# Patient Record
Sex: FEMALE | Race: BLACK OR AFRICAN AMERICAN
[De-identification: names, ages, dates, MRNs, and addresses within clinical notes are randomized per-mention and may not be internally consistent; named-entity substitution may affect disease eponyms.]

---

## 2020-11-04 ENCOUNTER — HOSPITAL ENCOUNTER (EMERGENCY)
Dept: HOSPITAL 72 - EMR | Age: 35
Discharge: HOME | End: 2020-11-04
Payer: COMMERCIAL

## 2020-11-04 VITALS — SYSTOLIC BLOOD PRESSURE: 99 MMHG | DIASTOLIC BLOOD PRESSURE: 74 MMHG

## 2020-11-04 VITALS — DIASTOLIC BLOOD PRESSURE: 74 MMHG | SYSTOLIC BLOOD PRESSURE: 99 MMHG

## 2020-11-04 VITALS — WEIGHT: 113 LBS | HEIGHT: 60 IN | BODY MASS INDEX: 22.19 KG/M2

## 2020-11-04 DIAGNOSIS — R91.1: ICD-10-CM

## 2020-11-04 DIAGNOSIS — N30.00: Primary | ICD-10-CM

## 2020-11-04 LAB
ADD MANUAL DIFF: NO
ALBUMIN SERPL-MCNC: 4 G/DL (ref 3.4–5)
ALBUMIN/GLOB SERPL: 1.1 {RATIO} (ref 1–2.7)
ALP SERPL-CCNC: 50 U/L (ref 46–116)
ALT SERPL-CCNC: 17 U/L (ref 12–78)
ANION GAP SERPL CALC-SCNC: 9 MMOL/L (ref 5–15)
APPEARANCE UR: (no result)
APTT PPP: (no result) S
AST SERPL-CCNC: 26 U/L (ref 15–37)
BASOPHILS NFR BLD AUTO: 0.6 % (ref 0–2)
BILIRUB SERPL-MCNC: 0.5 MG/DL (ref 0.2–1)
BUN SERPL-MCNC: 13 MG/DL (ref 7–18)
CALCIUM SERPL-MCNC: 8.7 MG/DL (ref 8.5–10.1)
CHLORIDE SERPL-SCNC: 104 MMOL/L (ref 98–107)
CO2 SERPL-SCNC: 26 MMOL/L (ref 21–32)
CREAT SERPL-MCNC: 0.9 MG/DL (ref 0.55–1.3)
EOSINOPHIL NFR BLD AUTO: 1 % (ref 0–3)
ERYTHROCYTE [DISTWIDTH] IN BLOOD BY AUTOMATED COUNT: 11.5 % (ref 11.6–14.8)
GLOBULIN SER-MCNC: 3.8 G/DL
GLUCOSE UR STRIP-MCNC: NEGATIVE MG/DL
HCT VFR BLD CALC: 47 % (ref 37–47)
HGB BLD-MCNC: 15.7 G/DL (ref 12–16)
KETONES UR QL STRIP: NEGATIVE
LEUKOCYTE ESTERASE UR QL STRIP: (no result)
LYMPHOCYTES NFR BLD AUTO: 8.2 % (ref 20–45)
MCV RBC AUTO: 98 FL (ref 80–99)
MONOCYTES NFR BLD AUTO: 6.6 % (ref 1–10)
NEUTROPHILS NFR BLD AUTO: 83.7 % (ref 45–75)
NITRITE UR QL STRIP: NEGATIVE
PH UR STRIP: 8 [PH] (ref 4.5–8)
PLATELET # BLD: 211 K/UL (ref 150–450)
POTASSIUM SERPL-SCNC: 3.9 MMOL/L (ref 3.5–5.1)
PROT UR QL STRIP: (no result)
RBC # BLD AUTO: 4.8 M/UL (ref 4.2–5.4)
SODIUM SERPL-SCNC: 139 MMOL/L (ref 136–145)
SP GR UR STRIP: 1.01 (ref 1–1.03)
UROBILINOGEN UR-MCNC: NORMAL MG/DL (ref 0–1)
WBC # BLD AUTO: 12 K/UL (ref 4.8–10.8)

## 2020-11-04 PROCEDURE — 87181 SC STD AGAR DILUTION PER AGT: CPT

## 2020-11-04 PROCEDURE — 99284 EMERGENCY DEPT VISIT MOD MDM: CPT

## 2020-11-04 PROCEDURE — 76856 US EXAM PELVIC COMPLETE: CPT

## 2020-11-04 PROCEDURE — 80053 COMPREHEN METABOLIC PANEL: CPT

## 2020-11-04 PROCEDURE — 36415 COLL VENOUS BLD VENIPUNCTURE: CPT

## 2020-11-04 PROCEDURE — 96375 TX/PRO/DX INJ NEW DRUG ADDON: CPT

## 2020-11-04 PROCEDURE — 87086 URINE CULTURE/COLONY COUNT: CPT

## 2020-11-04 PROCEDURE — 85025 COMPLETE CBC W/AUTO DIFF WBC: CPT

## 2020-11-04 PROCEDURE — 76830 TRANSVAGINAL US NON-OB: CPT

## 2020-11-04 PROCEDURE — 81025 URINE PREGNANCY TEST: CPT

## 2020-11-04 PROCEDURE — 96365 THER/PROPH/DIAG IV INF INIT: CPT

## 2020-11-04 PROCEDURE — 84702 CHORIONIC GONADOTROPIN TEST: CPT

## 2020-11-04 PROCEDURE — 74177 CT ABD & PELVIS W/CONTRAST: CPT

## 2020-11-04 PROCEDURE — 83690 ASSAY OF LIPASE: CPT

## 2020-11-04 PROCEDURE — 81003 URINALYSIS AUTO W/O SCOPE: CPT

## 2020-11-04 NOTE — DIAGNOSTIC IMAGING REPORT
Clinical Indication: Abdominal pain

 

Technique:   No oral contrast utilized, per emergency room physician request  IV

administration nonionic contrast. Venous phase spiral acquisition obtained through

the abdomen and pelvis. Multiplanar reconstructions were generated. Total dose length

product 170 mGycm. CTDIvol(s) 3 mGy. Dose reduction achieved using automated exposure

control

 

 

Comparison: none

 

Findings: Lack of enteric contrast limits assessment of the GI tract. The appendix is

normal. No evidence of diverticulosis or diverticulitis. There is trace free pelvic

fluid. No small bowel distention. No free intraperitoneal gas. The stomach is

somewhat distended with food, otherwise unremarkable. Distal esophagus and duodenum

are unremarkable.

 

The liver, gallbladder, bile ducts, pancreas, spleen, adrenals, kidneys are

unremarkable. No retroperitoneal or mesenteric mass or adenopathy. No pelvic mass or

adenopathy.

 

The included lung bases demonstrate a 3 mm subpleural nodule on the right, image 5 of

series 6. The bones are unremarkable.

 

Impression: Limited assessment of the GI tract, due to lack of enteric contrast

administration

 

No definite acute abnormality

 

Small amount of free pelvic fluid, presumably physiologic

 

 

 

 

 

The CT scanner at [facility] is accredited by the American College of

Radiology and the scans are performed using protocols designed to limit radiation

exposure to as low as reasonably achievable to attain images of sufficient resolution

adequate for diagnostic evaluation.

## 2020-11-04 NOTE — DIAGNOSTIC IMAGING REPORT
Indication: Pelvic and right adnexal region pain, negative pregnancy test

 

Technique: Transabdominal and transvaginal images of the pelvis. Doppler

interrogation of the ovaries

 

Comparison: none

 

Findings: Uterus measures 7.4 cm length by 4 cm AP. Endometrium measures 7 mm thick.

No myometrial abnormality. Normal right ovary is normal in size, demonstrates normal

Doppler signal. The left ovary cannot be visualized. There is trace free cul-de-sac

fluid.

 

Impression: Trace free cul-de-sac fluid, presumably physiologic

 

No acute or significant abnormality. Note inability to visualize the left ovary

## 2020-11-04 NOTE — EMERGENCY ROOM REPORT
History of Present Illness


General


Chief Complaint:  Abdominal Pain


Source:  Patient





Present Illness


HPI


35-year-old female presents with right lower quadrant pain sharp in nature came 

on 2 hours ago aggravated with pressure, alleviated with rest severity is 

moderate, constant no fevers no chills, patient endorses nausea, vomiting, 

patient presents for evaluation and treatment


Allergies:  


Coded Allergies:  


     No Known Allergies (Unverified , 11/4/20)





COVID-19 Screening


Contact w/high risk pt:  No


Experienced COVID-19 symptoms?:  No


COVID-19 Testing performed PTA:  No





Patient History


Past Medical History:  see triage record


Last Menstrual Period:  spotting today


Pregnant Now:  No


Reviewed Nursing Documentation:  PMH: Agreed; PSxH: Agreed





Nursing Documentation-PMH


Past Medical History:  No Stated History





Review of Systems


All Other Systems:  negative except mentioned in HPI





Physical Exam





Vital Signs








  Date Time  Temp Pulse Resp B/P (MAP) Pulse Ox O2 Delivery O2 Flow Rate FiO2


 


11/4/20 12:11 98.1 70 18 99/74 (82) 98 Room Air  








Sp02 EP Interpretation:  reviewed, normal


General Appearance:  well appearing, no apparent distress, alert


Head:  normocephalic, atraumatic


Eyes:  bilateral eye PERRL, bilateral eye EOMI


ENT:  uvula midline, moist mucus membranes


Neck:  supple, thyroid normal, supple/symm/no masses


Respiratory:  lungs clear, no respiratory distress, no retraction, no accessory 

muscle use


Cardiovascular #1:  normal peripheral pulses, regular rate, rhythm, no edema, no

gallop, no murmur


Gastrointestinal:  soft, no guarding, no rebound, tenderness - Right lower 

quadrant moderate, suprapubically


Musculoskeletal:  normal inspection


Neurologic:  alert, oriented x3


Psychiatric:  mood/affect normal


Skin:  no rash, warm/dry





Medical Decision Making


Diagnostic Impression:  


   Primary Impression:  


   UTI (urinary tract infection)


   Qualified Codes:  N30.00 - Acute cystitis without hematuria


ER Course


35-year-old female presents with right lower quadrant, suprapubic pain, x2 

hours, no known aggravating relieving factors severity is mild


Patient given ceftriaxone\


Considerations for appendicitis, ovarian torsion, ovarian cyst were in the 

differential


Patient improved with pain medications, and ceftriaxone patient most likely with

a urinary tract infection that is ascending patient given strict abdominal 

return precautions, patient also given information in regards to an incidental 

nodule. 


Patient aware will follow-up with pcp





Laboratory Tests








Test


 11/4/20


12:20 11/4/20


12:35 11/4/20


13:00


 


Urine Color Pale yellow    


 


Urine Appearance


 Slightly


cloudy 


 





 


Urine pH 8 (4.5-8.0)    


 


Urine Specific Gravity


 1.010


(1.005-1.035) 


 





 


Urine Protein


 2+ (NEGATIVE)


H 


 





 


Urine Glucose (UA)


 Negative


(NEGATIVE) 


 





 


Urine Ketones


 Negative


(NEGATIVE) 


 





 


Urine Blood


 4+ (NEGATIVE)


H 


 





 


Urine Nitrite


 Negative


(NEGATIVE) 


 





 


Urine Bilirubin


 Negative


(NEGATIVE) 


 





 


Urine Urobilinogen


 Normal MG/DL


(0.0-1.0) 


 





 


Urine Leukocyte Esterase


 2+ (NEGATIVE)


H 


 





 


Urine RBC


 0-2 /HPF (0 -


2) 


 





 


Urine WBC


 5-10 /HPF (0 -


2)  H 


 





 


Urine Squamous Epithelial


Cells Many /LPF


(NONE/OCC)  H 


 





 


Urine Bacteria


 Many /HPF


(NONE)  H 


 





 


Urine HCG, Qualitative


 Negative


(NEGATIVE) 


 





 


Sodium Level


 


 139 MMOL/L


(136-145) 





 


Potassium Level


 


 3.9 MMOL/L


(3.5-5.1) 





 


Chloride Level


 


 104 MMOL/L


() 





 


Carbon Dioxide Level


 


 26 MMOL/L


(21-32) 





 


Anion Gap


 


 9 mmol/L


(5-15) 





 


Blood Urea Nitrogen


 


 13 mg/dL


(7-18) 





 


Creatinine


 


 0.9 MG/DL


(0.55-1.30) 





 


Estimated Glomerular


Filtration Rate 


 > 60 mL/min


(>60) 





 


Glucose Level


 


 123 MG/DL


()  H 





 


Calcium Level


 


 8.7 MG/DL


(8.5-10.1) 





 


Total Bilirubin


 


 0.5 MG/DL


(0.2-1.0) 





 


Aspartate Amino Transferase


(AST) 


 26 U/L (15-37)


 





 


Alanine Aminotransferase (ALT)


 


 17 U/L (12-78)


 





 


Alkaline Phosphatase


 


 50 U/L


() 





 


Total Protein


 


 7.8 G/DL


(6.4-8.2) 





 


Albumin


 


 4.0 G/DL


(3.4-5.0) 





 


Globulin  3.8 g/dL   


 


Albumin/Globulin Ratio  1.1 (1.0-2.7)   


 


Lipase


 


 269 U/L


() 





 


Human Chorionic Gonadotropin,


Quant 


 2 mIU/mL (1-6)


 





 


White Blood Count


 


 


 12.0 K/UL


(4.8-10.8)  H


 


Red Blood Count


 


 


 4.80 M/UL


(4.20-5.40)


 


Hemoglobin


 


 


 15.7 G/DL


(12.0-16.0)


 


Hematocrit


 


 


 47.0 %


(37.0-47.0)


 


Mean Corpuscular Volume   98 FL (80-99)  


 


Mean Corpuscular Hemoglobin


 


 


 32.8 PG


(27.0-31.0)  H


 


Mean Corpuscular Hemoglobin


Concent 


 


 33.5 G/DL


(32.0-36.0)


 


Red Cell Distribution Width


 


 


 11.5 %


(11.6-14.8)  L


 


Platelet Count


 


 


 211 K/UL


(150-450)


 


Mean Platelet Volume


 


 


 7.8 FL


(6.5-10.1)


 


Neutrophils (%) (Auto)


 


 


 83.7 %


(45.0-75.0)  H


 


Lymphocytes (%) (Auto)


 


 


 8.2 %


(20.0-45.0)  L


 


Monocytes (%) (Auto)


 


 


 6.6 %


(1.0-10.0)


 


Eosinophils (%) (Auto)


 


 


 1.0 %


(0.0-3.0)


 


Basophils (%) (Auto)


 


 


 0.6 %


(0.0-2.0)








Microbiology








 Date/Time


Source Procedure


Growth Status





 


 11/4/20 12:38


Nasopharynx SARS-CoV-2 RdRp Gene Assay - Final Complete








CT/MRI/US Diagnostic Results


CT/MRI/US Diagnostic Results :  


   Impression





Procedure: CT Abdomen Pelvis w/Contrast


Clinical Indication: Abdominal pain


 


Technique:   No oral contrast utilized, per emergency room physician request  IV


administration nonionic contrast. Venous phase spiral acquisition obtained 

through


the abdomen and pelvis. Multiplanar reconstructions were generated. Total dose 

length


product 170 mGycm. CTDIvol(s) 3 mGy. Dose reduction achieved using automated 

exposure


control


 


 


Comparison: none


 


Findings: Lack of enteric contrast limits assessment of the GI tract. The 

appendix is


normal. No evidence of diverticulosis or diverticulitis. There is trace free 

pelvic


fluid. No small bowel distention. No free intraperitoneal gas. The stomach is


somewhat distended with food, otherwise unremarkable. Distal esophagus and 

duodenum


are unremarkable.


 


The liver, gallbladder, bile ducts, pancreas, spleen, adrenals, kidneys are


unremarkable. No retroperitoneal or mesenteric mass or adenopathy. No pelvic 

mass or


adenopathy.


 


The included lung bases demonstrate a 3 mm subpleural nodule on the right, image

5 of


series 6. The bones are unremarkable.


 


Impression: Limited assessment of the GI tract, due to lack of enteric contrast


administration


 


No definite acute abnormality


 


Small amount of free pelvic fluid, presumably physiologic


 


 


 


 


 


The CT scanner at [facility] is accredited by the American College 

of


Radiology and the scans are performed using protocols designed to limit 

radiation


exposure to as low as reasonably achievable to attain images of sufficient 

resolution


adequate for diagnostic evaluation.


 


Procedure: US Pelvic w/Transvag


Indication: Pelvic and right adnexal region pain, negative pregnancy test


 


Technique: Transabdominal and transvaginal images of the pelvis. Doppler


interrogation of the ovaries


 


Comparison: none


 


Findings: Uterus measures 7.4 cm length by 4 cm AP. Endometrium measures 7 mm 

thick.


No myometrial abnormality. Normal right ovary is normal in size, demonstrates 

normal


Doppler signal. The left ovary cannot be visualized. There is trace free 

cul-de-sac


fluid.


 


Impression: Trace free cul-de-sac fluid, presumably physiologic


 


No acute or significant abnormality. Note inability to visualize the left ovary


 


 














Dictated By:    Art Beck MD                                 


Electronically Signed By:Art Beck MD                                 


Signed Date/Time11/04/20 1630                                   








CC: Olman Omalley MD





Last Vital Signs








  Date Time  Temp Pulse Resp B/P (MAP) Pulse Ox O2 Delivery O2 Flow Rate FiO2


 


11/4/20 12:11 98.1 70 18 99/74 (82) 98 Room Air  








Disposition:  HOME, SELF-CARE


Condition:  Stable


Scripts


Cephalexin* (KEFLEX*) 500 Mg Tablet


500 MG ORAL EVERY 6 HOURS, #40 CAP


   Prov: Olman Omalley MD         11/4/20


Referrals:  


[facility]


Patient Instructions:  Urinary Tract Infection, Easy-to-Read





Additional Instructions:  


The patient was provided with discharge instructions, notified to follow-up with

a primary care doctor and or specialist in the next 24-48 hours, and to return 

to the ED if they have worsening of their symptoms. 





Please note that this report is being documented using DRAGON technology.


This can lead to erroneous entry secondary to incorrect interpretation by the 

dictating instrument. 





INCIDENTAL SUBPLEURAL NODULE 3MM ON RIGHT NEEDS FOLLOW-UP NON EMERGENT WITH NON 

CONTRAST CT IN 6-12 MONTHS .











Olman Omalley MD           Nov 4, 2020 12:29